# Patient Record
Sex: MALE | Race: BLACK OR AFRICAN AMERICAN | Employment: UNEMPLOYED | URBAN - METROPOLITAN AREA
[De-identification: names, ages, dates, MRNs, and addresses within clinical notes are randomized per-mention and may not be internally consistent; named-entity substitution may affect disease eponyms.]

---

## 2024-01-08 ENCOUNTER — HOSPITAL ENCOUNTER (EMERGENCY)
Age: 19
Discharge: HOME | End: 2024-01-08
Payer: COMMERCIAL

## 2024-01-08 VITALS
OXYGEN SATURATION: 97 % | SYSTOLIC BLOOD PRESSURE: 111 MMHG | RESPIRATION RATE: 16 BRPM | HEART RATE: 108 BPM | BODY MASS INDEX: 28.79 KG/M2 | DIASTOLIC BLOOD PRESSURE: 80 MMHG | WEIGHT: 190 LBS | TEMPERATURE: 99.5 F | HEIGHT: 68 IN

## 2024-01-08 DIAGNOSIS — L02.31 GLUTEAL ABSCESS: Primary | ICD-10-CM

## 2024-01-08 PROCEDURE — 99283 EMERGENCY DEPT VISIT LOW MDM: CPT

## 2024-01-08 RX ORDER — IBUPROFEN 800 MG/1
800 TABLET ORAL
Qty: 21 TABLET | Refills: 0 | Status: SHIPPED | OUTPATIENT
Start: 2024-01-08 | End: 2024-01-15

## 2024-01-08 RX ORDER — CLINDAMYCIN HYDROCHLORIDE 300 MG/1
300 CAPSULE ORAL 3 TIMES DAILY
Qty: 21 CAPSULE | Refills: 0 | Status: SHIPPED | OUTPATIENT
Start: 2024-01-08 | End: 2024-01-15

## 2024-01-08 ASSESSMENT — PAIN SCALES - GENERAL
PAINLEVEL_OUTOF10: 3
PAINLEVEL_OUTOF10: 7

## 2024-01-08 ASSESSMENT — PAIN DESCRIPTION - ORIENTATION: ORIENTATION: LEFT

## 2024-01-08 ASSESSMENT — PAIN DESCRIPTION - LOCATION: LOCATION: BUTTOCKS

## 2024-01-08 ASSESSMENT — PAIN - FUNCTIONAL ASSESSMENT
PAIN_FUNCTIONAL_ASSESSMENT: NONE - DENIES PAIN
PAIN_FUNCTIONAL_ASSESSMENT: 0-10

## 2024-01-08 NOTE — ED NOTES
I have reviewed discharge instructions with the patient.  The patient verbalized understanding.    Patient left ED via Discharge Method: ambulatory to Home with self.    Opportunity for questions and clarification provided.       Patient given 2 scripts.         To continue your aftercare when you leave the hospital, you may receive an automated call from our care team to check in on how you are doing.  This is a free service and part of our promise to provide the best care and service to meet your aftercare needs.” If you have questions, or wish to unsubscribe from this service please call [phone].  Thank you for Choosing our [facility] Emergency Department.

## 2024-01-08 NOTE — ED PROVIDER NOTES
Emergency Department Provider Note       PCP: No, Pcp   Age: 18 y.o.   Sex: male     DISPOSITION Decision To Discharge 01/08/2024 05:07:32 PM       ICD-10-CM    1. Gluteal abscess  L02.31           Medical Decision Making     Complexity of Problems Addressed:  Complexity of Problem: 1 acute, uncomplicated illness or injury.    Data Reviewed and Analyzed:  I independently ordered and reviewed each unique test.             Discussion of management or test interpretation.  Patient is a 18-year-old male who presents with complaint of buttock pain x 3 days with concerns for an abscess that needs to be drained.  He states he has had this happen before and sometimes they will drain on their own however pain is not worse over the last 3 days causing him to come in for evaluation.  He denies any fever or chills.  Denies any history of diabetes.  He is afebrile, nontoxic in appearance.  On exam he appears to have a open and draining abscess to the left perineal region.  Was able to express pus by applying pressure to the area.  Dressing applied.  Patient was sent home with prescription for clindamycin and advised to apply 1 compresses and perform sitz bath's.  Did recommend use of probiotic on antibiotics.  Also given prescription for Motrin 800 mg.  He was also provided with a note for work.  Discussed at length what to watch for regards to worsening infection and reasons to return immediately to the ER.  Patient verbalizes understanding and is agreeable to plan.       Risk of Complications and/or Morbidity of Patient Management:  Prescription drug management performed and Shared medical decision making was utilized in creating the patients health plan today.    History      Patient is a 18-year-old male who presents with complaint of left buttock pain x 3 days.  He states he is concerned he has an abscess that needs to be drained on his left buttock.  States that this has happened once before in the past.  Denies any

## 2024-01-08 NOTE — DISCHARGE INSTRUCTIONS
You have an abscess to the perennial region.  You are being discharged with clindamycin which is an antibiotic please take it 3 times daily for the next 7 days.  This antibiotic can be harsh on your stomach so we do recommend taking a probiotic or eating yogurt while on it.  We are also sending you home with ibuprofen 800 mg to be taken every 6-8 hours for pain.  Please soak in the tub twice daily and/or apply warm compresses to the area.  Information for follow-up and [facility] clinic for further evaluation.  Return immediately to the ER with any high fevers, worsening pain or worsening abscess.

## 2024-01-08 NOTE — ED TRIAGE NOTES
Patient advises abscess to left buttocks x 3 days not draining at this time. Worse with movement.

## 2024-01-08 NOTE — ED NOTES
Abscess in upper R leg/R buttocks draining upon assessment; provider continued drainage and placed 4x4s. Pt tolerated well